# Patient Record
Sex: MALE | Race: OTHER | HISPANIC OR LATINO | URBAN - METROPOLITAN AREA
[De-identification: names, ages, dates, MRNs, and addresses within clinical notes are randomized per-mention and may not be internally consistent; named-entity substitution may affect disease eponyms.]

---

## 2021-05-26 ENCOUNTER — EMERGENCY (EMERGENCY)
Facility: HOSPITAL | Age: 56
LOS: 0 days | Discharge: HOME | End: 2021-05-26
Attending: EMERGENCY MEDICINE | Admitting: EMERGENCY MEDICINE
Payer: COMMERCIAL

## 2021-05-26 VITALS
TEMPERATURE: 98 F | WEIGHT: 160.06 LBS | DIASTOLIC BLOOD PRESSURE: 83 MMHG | HEIGHT: 67 IN | OXYGEN SATURATION: 99 % | HEART RATE: 91 BPM | SYSTOLIC BLOOD PRESSURE: 112 MMHG | RESPIRATION RATE: 18 BRPM

## 2021-05-26 DIAGNOSIS — F41.9 ANXIETY DISORDER, UNSPECIFIED: ICD-10-CM

## 2021-05-26 DIAGNOSIS — F63.9 IMPULSE DISORDER, UNSPECIFIED: ICD-10-CM

## 2021-05-26 DIAGNOSIS — F32.9 MAJOR DEPRESSIVE DISORDER, SINGLE EPISODE, UNSPECIFIED: ICD-10-CM

## 2021-05-26 PROCEDURE — 99284 EMERGENCY DEPT VISIT MOD MDM: CPT

## 2021-05-26 RX ORDER — HYDROXYZINE HCL 10 MG
1 TABLET ORAL
Qty: 15 | Refills: 0
Start: 2021-05-26 | End: 2021-05-30

## 2021-05-26 RX ORDER — HYDROXYZINE HCL 10 MG
50 TABLET ORAL ONCE
Refills: 0 | Status: COMPLETED | OUTPATIENT
Start: 2021-05-26 | End: 2021-05-26

## 2021-05-26 RX ADMIN — Medication 50 MILLIGRAM(S): at 19:33
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
## 2021-05-26 NOTE — ED PROVIDER NOTE - NSFOLLOWUPCLINICS_GEN_ALL_ED_FT
[facility]  [facility]  [address] [place] [id number]  Phone: [phone]  Fax:   Follow Up Time: 1-3 Days
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
## 2021-05-26 NOTE — ED BEHAVIORAL HEALTH ASSESSMENT NOTE - NSCURPASTPSYDX_PSY_ALL_CORE
Patient discussed in morning rounds. Patient ready for discharge home today.  RICHI informed patient will need assistance getting home.  RICHI completed patient assistance fund request and received auth from Anna for a cab voucher.  RICHI completed cab voucher and provided to the floor RN.    PTSD

## 2021-05-26 NOTE — ED PROVIDER NOTE - NS ED ROS FT
CONSTITUTIONAL: (-) fevers, (-) chills, (-) decreased appetite  EYES: (-) vision changes, (-) photophobia  ENT: (-) congestion, (-) rhinorrhea  CARDIO: (-) chest pain, (-) palpitations, (-) edema  PULM: (-) cough, (-) sputum, (-) chest tightness, (-) shortness of breath  GI: (-) nausea, (-) vomiting, (-) diarrhea, (-) constipation, (-) abdominal pain  : (-) dysuria, (-) hematuria, (-) frequency, (-) urgency, (-) flank pain  MSK: (-) back pain, (-) myalgias, (-) gait difficulty  SKIN: (-) rashes, (-) pallor, (-) itching, (-) wounds, (-) ecchymosis  NEURO: (-) headache, (-) head injury, (-) LOC  PSYCH: see HPI, (-) suicidal ideation, (-) homicidal ideation    *all other systems negative except as documented above and in the HPI*
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
## 2021-05-26 NOTE — ED ADULT TRIAGE NOTE - CHIEF COMPLAINT QUOTE
Patient stated he has been feeling anxious and depressed for the last 5 days since being told by his wife that she wants a divorce. Patient denies Suicidal Homicidal ideations as well as auditory and visual hallucinations
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
## 2021-05-26 NOTE — ED BEHAVIORAL HEALTH ASSESSMENT NOTE - RISK ASSESSMENT
Risk assessment: Risk factors include anxious and depressed mood, history of aggressive behavior, impulsivity, history of adverse childhood events, history of IPP admissions, concern from therapists; Protective factors include engagement in treatment, no known history of suicide attempts, no current suicidal or homicidal ideations, future oriented towards getting treatment, feelings of responsibility towards children. Moderately elevated risk for aggressive behavior, low acute risk for suicidal behavior; chronically elevated risk threat to self and others. Low Acute Suicide Risk
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
## 2021-05-26 NOTE — ED PROVIDER NOTE - NSFOLLOWUPINSTRUCTIONS_ED_ALL_ED_FT
Anxiety    Generalized anxiety disorder (KATERYNA) is a mental disorder. It is defined as anxiety that is not necessarily related to specific events or activities or is out of proportion to said events. Symptoms include restlessness, fatigue, difficulty concentrations, irritability and difficulty concentrating. It may interfere with life functions, including relationships, work, and school. If you were started on a medication, make sure to take exactly as prescribed and follow up with a psychiatrist.    SEEK IMMEDIATE MEDICAL CARE IF YOU HAVE ANY OF THE FOLLOWING SYMPTOMS: thoughts about hurting killing yourself, thoughts about hurting or killing somebody else, hallucinations, or worsening depression.
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
## 2021-05-26 NOTE — ED PROVIDER NOTE - PATIENT PORTAL LINK FT
You can access the FollowMyHealth Patient Portal offered by [facility] by registering at the following website: [email or web address]. By joining [facility]’s FollowMyHealth portal, you will also be able to view your health information using other applications (apps) compatible with our system.

## 2021-05-26 NOTE — ED BEHAVIORAL HEALTH ASSESSMENT NOTE - RELATEDNESS
"              After Visit Summary   3/14/2017    Jayme Selby    MRN: [id number]           Patient Information     Date Of Birth          1958        Visit Information        Provider Department      3/14/2017 2:45 PM Merry Stanton MD [facility]        Today's Diagnoses     Dysfunction of eustachian tube, left    -  1      Care Instructions    Per Physician's instructions          Follow-ups after your visit        Follow-up notes from your care team     Return if symptoms worsen or fail to improve.      Who to contact     If you have questions or need follow up information about today's clinic visit or your schedule please contact [facility] directly at [phone].  Normal or non-critical lab and imaging results will be communicated to you by MyChart, letter or phone within 4 business days after the clinic has received the results. If you do not hear from us within 7 days, please contact the clinic through MyChart or phone. If you have a critical or abnormal lab result, we will notify you by phone as soon as possible.  Submit refill requests through [facility] or call your pharmacy and they will forward the refill request to us. Please allow 3 business days for your refill to be completed.          Additional Information About Your Visit        MyChart Information     [facility] lets you send messages to your doctor, view your test results, renew your prescriptions, schedule appointments and more. To sign up, go to [email or web address] . Click on \"Log in\" on the left side of the screen, which will take you to the Welcome page. Then click on \"Sign up Now\" on the right side of the page.     You will be asked to enter the access code listed below, as well as some personal information. Please follow the directions to create your username and password.     Your access code is: [email or web address]  Expires: 2017  3:17 PM     Your access code will  in 90 days. If you need help or a " "new code, please call your [facility] clinic or [phone].        Care EveryWhere ID     This is your Care EveryWhere ID. This could be used by other organizations to access your [facility] medical records  [id number]        Your Vitals Were     Pulse Temperature Height BMI (Body Mass Index)          76 98.5  F (36.9  C) (Oral) 1.88 m (6' 2\") 34.15 kg/m2         Blood Pressure from Last 3 Encounters:   03/14/17 (!) 158/97   03/10/17 152/88   03/10/17 145/89    Weight from Last 3 Encounters:   03/14/17 120.7 kg (266 lb)   03/10/17 121 kg (266 lb 12.8 oz)   03/10/17 121 kg (266 lb 12.8 oz)              Today, you had the following     No orders found for display         Today's Medication Changes          These changes are accurate as of: 3/14/17  3:17 PM.  If you have any questions, ask your nurse or doctor.               These medicines have changed or have updated prescriptions.        Dose/Directions    fluticasone 50 MCG/ACT spray   Commonly known as:  FLONASE   This may have changed:  when to take this   Used for:  Seasonal allergic rhinitis        Dose:  2 spray   Spray 2 sprays into both nostrils daily as needed   Quantity:  1 Bottle   Refills:  3                Primary Care Provider Office Phone # Fax #    R Brett Aceves [phone]       [place] [address]        Thank you!     Thank you for choosing [facility]  for your care. Our goal is always to provide you with excellent care. Hearing back from our patients is one way we can continue to improve our services. Please take a few minutes to complete the written survey that you may receive in the mail after your visit with us. Thank you!             Your Updated Medication List - Protect others around you: Learn how to safely use, store and throw away your medicines at www.disposemymeds.org.          This list is accurate as of: 3/14/17  3:17 PM.  Always use your most recent med " list.                   Brand Name Dispense Instructions for use    albuterol 108 (90 BASE) MCG/ACT Inhaler    PROAIR HFA/PROVENTIL HFA/VENTOLIN HFA    1 Inhaler    Inhale 2 puffs into the lungs every 4 hours as needed       ascorbic acid 250 MG Chew chewable tablet    vitamin C     Take 500 mg by mouth daily       fluticasone 50 MCG/ACT spray    FLONASE    1 Bottle    Spray 2 sprays into both nostrils daily as needed       metoprolol 50 MG 24 hr tablet    TOPROL-XL    90 tablet    Take 1 tablet (50 mg) by mouth daily       nicotine polacrilex 2 MG lozenge    COMMIT     Place 2 mg inside cheek every hour as needed for smoking cessation Reported on 2/20/2017       NYQUIL COLD & FLU PO      Take by mouth At Bedtime Reported on 3/14/2017       ofloxacin 0.3 % ophthalmic solution    OCUFLOX     INT 10 GTS IN LEFT EAR BID FOR 7 DAYS       ranitidine 75 MG tablet   Generic drug:  ranitidine      Take 1 tablet by mouth daily Reported on 3/10/2017       TUMS 500 MG chewable tablet   Generic drug:  calcium carbonate      Take 1 chew tab by mouth as needed for heartburn       ZYRTEC ALLERGY 10 MG tablet   Generic drug:  cetirizine      Take 10 mg by mouth daily          Fair

## 2021-05-26 NOTE — ED PROVIDER NOTE - PROGRESS NOTE DETAILS
BRAXTON: psych consulted upon my initial evaluation here in ED, repaged again. BRAXTON: psych has recommended partial hospitalization program, will dc with hydroxyzine rx. Patient agreeable and verbalizes understanding of plan of care, f/u, and return precautions.
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
## 2021-05-26 NOTE — ED PROVIDER NOTE - PHYSICAL EXAMINATION
VITALS:  I have reviewed the initial vital signs.  GENERAL: Well-developed, well-nourished, in no acute distress. Nontoxic.  HEENT: NC/AT. EOMI, PERRLA. MMM.  NECK: supple w FROM.   CARDIO: RRR, nl S1 and S2. No murmurs, rubs, or gallops.   PULM: Normal effort. CTA b/l without wheezes, rales, or rhonchi.  MSK: Normal, steady gait.   SKIN: Warm, dry. No erythema, ecchymosis, or wounds.  NEURO: A&Ox3. Speech clear. No focal deficits.  PSYCH: Calm and cooperative.

## 2022-12-23 ENCOUNTER — EMERGENCY (EMERGENCY)
Facility: HOSPITAL | Age: 57
LOS: 1 days | Discharge: HOME | End: 2022-12-23
Admitting: EMERGENCY MEDICINE
Payer: SELF-PAY

## 2022-12-23 VITALS
HEIGHT: 67 IN | TEMPERATURE: 98 F | HEART RATE: 68 BPM | RESPIRATION RATE: 18 BRPM | OXYGEN SATURATION: 97 % | DIASTOLIC BLOOD PRESSURE: 84 MMHG | WEIGHT: 164.91 LBS | SYSTOLIC BLOOD PRESSURE: 125 MMHG

## 2022-12-23 PROCEDURE — 73130 X-RAY EXAM OF HAND: CPT

## 2022-12-23 PROCEDURE — 99283 EMERGENCY DEPT VISIT LOW MDM: CPT

## 2022-12-23 PROCEDURE — 99284 EMERGENCY DEPT VISIT MOD MDM: CPT

## 2022-12-23 PROCEDURE — 73110 X-RAY EXAM OF WRIST: CPT | Mod: 26,LT

## 2022-12-23 PROCEDURE — 73110 X-RAY EXAM OF WRIST: CPT

## 2022-12-23 PROCEDURE — 73130 X-RAY EXAM OF HAND: CPT | Mod: 26,LT

## 2022-12-23 NOTE — ED ADULT NURSE NOTE - NSIMPLEMENTINTERV_GEN_ALL_ED
Implemented All Universal Safety Interventions:  [place] to call system. Call bell, personal items and telephone within reach. Instruct patient to call for assistance. Room bathroom lighting operational. Non-slip footwear when patient is off stretcher. Physically safe environment: no spills, clutter or unnecessary equipment. Stretcher in lowest position, wheels locked, appropriate side rails in place.

## 2022-12-23 NOTE — ED PROVIDER NOTE - NSFOLLOWUPINSTRUCTIONS_ED_ALL_ED_FT
Thumb Fracture       A thumb fracture is a break in one of the two bones in your thumb. The bone that goes from the tip of your thumb to the first joint in your thumb is called the distal phalanx. The bone that goes from the first joint to the joint at the base of your thumb is called the proximal phalanx.    Fractures that happen at the joints of your thumb are harder to treat. A broken thumb is more serious than a break in one of your other fingers because you need your thumb for grasping. Thumb fractures are also more likely to lead to pain and stiffness years after healing (arthritis).      What are the causes?    A thumb fracture may be caused by:  •A hard, direct hit to your thumb.      •Your thumb being pulled out of place.        What increases the risk?    You may be more likely to break your thumb if you:  •Participate in sports such as wrestling, hockey, football, or skiing.      •Have a condition that causes your bones to become thin and brittle (osteoporosis).        What are the signs or symptoms?    Symptoms may include:  •Sudden severe pain.      •Swelling.      •Bruising.      •Not being able to move the thumb.      •An abnormal shape of the thumb (deformity).      •Numbness or coldness.      •A red, black, or blue thumbnail.        How is this diagnosed?    This condition may be diagnosed based on:  •Your symptoms and medical history.      •A physical exam.      •Imaging studies such as X-ray, ultrasound, or MRI.        How is this treated?    Treatment for this condition depends on the severity of the fracture.  •At first, you may need to wear a padded splint until you can get a cast or have surgery. The padded splint protects your thumb and keeps it from moving (immobilization).      •If the broken pieces of your bone line up with each other, you will need to wear a splint or a cast for up to 4–6 weeks.    •If your fracture is severe, your health care provider will need to align the bone pieces manually or surgically. Your health care provider may:  •Move the bones back into position without surgery (closed reduction).      •Do surgery to align the fracture and put in metal screws, plates, or wires to hold the bone pieces in place (open reduction and internal fixation, ORIF).      •Do surgery to align the fracture and put in pins that are attached to a stabilizing bar outside your skin to hold the bone pieces in place (external fixation).      •In all cases, treatment may involve:  •Wearing a splint or cast for up to 6 weeks.      •Follow-up visits with your health care provider and X-rays to make sure you are healing correctly.      •Doing exercises to restore full movement and strength to your thumb (physical therapy) after your cast is removed.          Follow these instructions at home:    If you have a splint:     •Wear the splint as told by your health care provider. Remove it only as told by your health care provider.      • Do not put pressure on any part of the splint until it is fully hardened. This may take several hours.      •Check the skin around the splint every day. Tell your health care provider about any concerns.      • Loosen the splint if your thumb or fingers tingle, become numb, or turn cold and blue.       •Keep the splint clean and dry.      If you have a cast:     • Do not put pressure on any part of the cast until it is fully hardened. This may take several hours.      • Do not stick anything inside the cast to scratch your skin. Doing that increases your risk of infection.      •Check the skin around the cast every day. Tell your health care provider about any concerns.      • You may put lotion on dry skin around the edges of the cast. Do not put lotion on the skin underneath the cast.       •Keep the cast clean and dry.      Bathing     • Do not take baths, swim, or use a hot tub until your health care provider approves. Ask your health care provider if you may take showers. You may only be allowed to take sponge baths.    •If the splint or cast is not waterproof:  •Do not let it get wet.      •Cover it with a watertight covering when you take a bath or shower.          Managing pain, stiffness, and swelling    •If directed, put ice on your thumb. To do this:  •If you have a removable splint, remove it as told by your health care provider.      • Put ice in a plastic bag.       •Place a towel between your skin and the bag, or between your cast and the bag.      •Leave the ice on for 20 minutes, 2–3 times a day.       •Remove the ice if your skin turns bright red. This is very important. If you cannot feel pain, heat, or cold, you have a greater risk of damage to the area.        •Move your thumb and fingers often to reduce stiffness and swelling.      •Raise (elevate) your hand above the level of your heart while you are sitting or lying down.      Activity     •Return to your normal activities as told by your health care provider. Ask your health care provider what activities are safe for you.    •After your cast is removed, do physical therapy exercises as directed. Your health care provider may recommend that you:  •Move your thumb in circles.      •Touch your thumb to your pinky finger.      •Do these exercises several times a day.        •Ask your health care provider if you may use a hand exerciser to strengthen your muscles.      •If your thumb feels stiff while you are exercising it, try doing the exercises while soaking your hand in warm water.      General instructions     •Take over-the-counter and prescription medicines only as told by your health care provider.      •Ask your health care provider when it is safe to drive if you have a splint or cast on your thumb.      • Do not use any products that contain nicotine or tobacco. These products include cigarettes, chewing tobacco, and vaping devices, such as e-cigarettes. These can delay bone healing. If you need help quitting, ask your health care provider.      •Keep all follow-up visits. This is important.        Contact a health care provider if:    •You have pain that gets worse.      •You have a fever.      •You have a bad smell coming from your cast or splint.        Get help right away if:    •Your thumb feels numb, tingles, turns cold, or turns blue.      •You have redness or swelling that gets worse.      •You have severe pain.        Summary    •A thumb fracture is a break in one of the two bones in your thumb.      •Treatment involves wearing a splint or cast to keep the thumb from moving until it heals. Sometimes surgery is needed.      •Make sure you understand and follow all of your health care provider's home care instructions.      This information is not intended to replace advice given to you by your health care provider. Make sure you discuss any questions you have with your health care provider.

## 2022-12-23 NOTE — ED ADULT TRIAGE NOTE - CHIEF COMPLAINT QUOTE
Pt walked into ED c/o left hand pain after heavy box fell on pts hand 3.5 weeks ago. pt endorses snuff box tenderness and difficulty moving thumb.

## 2022-12-23 NOTE — ED PROVIDER NOTE - CARE PROVIDER_API CALL
Michael Jacobo)  [facility]  [address] [place] [address]  Phone: [phone]  Fax: [phone]  Follow Up Time:

## 2022-12-23 NOTE — ED PROVIDER NOTE - OBJECTIVE STATEMENT
58 y/o m presents c/o left hand, left thumb pain after a heavy box fell on it 3-4 weeks ago.  Pt stating he has been having difficulty bending his thumb, feels a "clicking" in the joint.  Denies numbness/tingling to ext, all other ROS negative.

## 2022-12-23 NOTE — ED ADULT NURSE NOTE - OBJECTIVE STATEMENT
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  + radial pulses, warm to touch, + cap refill.

## 2022-12-23 NOTE — ED PROVIDER NOTE - MUSCULOSKELETAL, MLM
left hand +tenderness to DIPJ of 1st phalanx, +tenderness to base of 1st phalanx, no anatomical snuffbox tenderness, no swelling or deformity, +FROM 1st phalanx

## 2022-12-23 NOTE — ED PROVIDER NOTE - CLINICAL SUMMARY MEDICAL DECISION MAKING FREE TEXT BOX
58 y/o m presents c/o left thumb pain for the past 3-4 weeks after a heavy box fell on his hand.  Ext NVI, xray consistent with healing fractures of DIPJ and proximal 1st phalanx, will give prefab thumb spica, stable for d/c to f/u with hand surgery, pain meds

## 2022-12-25 DIAGNOSIS — M79.642 PAIN IN LEFT HAND: ICD-10-CM
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
## 2022-12-25 DIAGNOSIS — M79.645 PAIN IN LEFT FINGER(S): ICD-10-CM
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
## 2022-12-29 PROBLEM — Z00.00 ENCOUNTER FOR PREVENTIVE HEALTH EXAMINATION: Status: ACTIVE | Noted: 2022-12-29

## 2023-01-10 ENCOUNTER — APPOINTMENT (OUTPATIENT)
Dept: ORTHOPEDIC SURGERY | Facility: CLINIC | Age: 58
End: 2023-01-10

## 2023-02-13 NOTE — ED ADULT NURSE NOTE - CHIEF COMPLAINT
Detail Level: Detailed
Quality 110: Preventive Care And Screening: Influenza Immunization: Influenza Immunization Administered during Influenza season
The patient is a 57y Male complaining of hand pain/injury.

## 2023-05-01 ENCOUNTER — NON-APPOINTMENT (OUTPATIENT)
Age: 58
End: 2023-05-01

## 2024-06-18 ENCOUNTER — NON-APPOINTMENT (OUTPATIENT)
Age: 59
End: 2024-06-18

## 2024-10-10 ENCOUNTER — NON-APPOINTMENT (OUTPATIENT)
Age: 59
End: 2024-10-10

## 2024-11-01 NOTE — ED PROVIDER NOTE - PATIENT PORTAL LINK FT
Include Summary: yes
Letter Template: 0
You can access the FollowMyHealth Patient Portal offered by [facility] by registering at the following website: [email or web address]. By joining [facility]’s FollowMyHealth portal, you will also be able to view your health information using other applications (apps) compatible with our system.